# Patient Record
Sex: FEMALE | Race: ASIAN | NOT HISPANIC OR LATINO | URBAN - METROPOLITAN AREA
[De-identification: names, ages, dates, MRNs, and addresses within clinical notes are randomized per-mention and may not be internally consistent; named-entity substitution may affect disease eponyms.]

---

## 2018-06-01 ENCOUNTER — OUTPATIENT (OUTPATIENT)
Dept: OUTPATIENT SERVICES | Facility: HOSPITAL | Age: 63
LOS: 1 days | End: 2018-06-01
Payer: MEDICAID

## 2018-06-01 DIAGNOSIS — Z98.89 OTHER SPECIFIED POSTPROCEDURAL STATES: Chronic | ICD-10-CM

## 2018-06-01 PROCEDURE — G9001: CPT

## 2018-06-24 DIAGNOSIS — R69 ILLNESS, UNSPECIFIED: ICD-10-CM

## 2020-10-14 PROBLEM — Z00.00 ENCOUNTER FOR PREVENTIVE HEALTH EXAMINATION: Status: ACTIVE | Noted: 2020-10-14

## 2020-10-21 ENCOUNTER — APPOINTMENT (OUTPATIENT)
Dept: ENDOCRINOLOGY | Facility: CLINIC | Age: 65
End: 2020-10-21
Payer: MEDICARE

## 2020-10-21 VITALS
BODY MASS INDEX: 36.54 KG/M2 | HEIGHT: 64 IN | DIASTOLIC BLOOD PRESSURE: 56 MMHG | HEART RATE: 82 BPM | SYSTOLIC BLOOD PRESSURE: 148 MMHG | WEIGHT: 214 LBS

## 2020-10-21 DIAGNOSIS — Z72.3 LACK OF PHYSICAL EXERCISE: ICD-10-CM

## 2020-10-21 LAB
GLUCOSE BLDC GLUCOMTR-MCNC: 266
LDLC SERPL DIRECT ASSAY-MCNC: 81

## 2020-10-21 PROCEDURE — 95251 CONT GLUC MNTR ANALYSIS I&R: CPT

## 2020-10-21 PROCEDURE — 99072 ADDL SUPL MATRL&STAF TM PHE: CPT

## 2020-10-21 PROCEDURE — 99205 OFFICE O/P NEW HI 60 MIN: CPT | Mod: 25

## 2020-10-21 PROCEDURE — 82962 GLUCOSE BLOOD TEST: CPT

## 2020-10-21 RX ORDER — METOPROLOL TARTRATE 25 MG/1
25 TABLET, FILM COATED ORAL TWICE DAILY
Refills: 0 | Status: ACTIVE | COMMUNITY

## 2020-10-21 RX ORDER — GABAPENTIN 100 MG
100 TABLET ORAL
Refills: 0 | Status: ACTIVE | COMMUNITY

## 2020-10-21 RX ORDER — MEDROXYPROGESTERONE ACETATE 10 MG/1
10 TABLET ORAL
Refills: 0 | Status: ACTIVE | COMMUNITY

## 2020-10-21 RX ORDER — ATORVASTATIN CALCIUM 40 MG/1
40 TABLET, FILM COATED ORAL DAILY
Refills: 0 | Status: ACTIVE | COMMUNITY

## 2020-10-21 RX ORDER — ALPRAZOLAM 2 MG/1
TABLET ORAL
Refills: 0 | Status: ACTIVE | COMMUNITY

## 2020-10-21 RX ORDER — OMEPRAZOLE 40 MG/1
40 CAPSULE, DELAYED RELEASE ORAL TWICE DAILY
Refills: 0 | Status: ACTIVE | COMMUNITY

## 2020-10-21 RX ORDER — VENLAFAXINE HYDROCHLORIDE 150 MG/1
150 TABLET, EXTENDED RELEASE ORAL
Refills: 0 | Status: ACTIVE | COMMUNITY

## 2020-10-21 RX ORDER — CHLORHEXIDINE GLUCONATE 4 %
325 (65 FE) LIQUID (ML) TOPICAL 3 TIMES DAILY
Refills: 0 | Status: ACTIVE | COMMUNITY

## 2020-10-21 RX ORDER — ALENDRONATE SODIUM 70 MG/1
70 TABLET ORAL
Refills: 0 | Status: ACTIVE | COMMUNITY

## 2020-10-21 RX ORDER — OMEGA-3-ACID ETHYL ESTERS 1 G/1
1 CAPSULE, LIQUID FILLED ORAL
Qty: 360 | Refills: 1 | Status: ACTIVE | COMMUNITY

## 2020-10-21 RX ORDER — ISOSORBIDE MONONITRATE 30 MG
30 TABLET, EXTENDED RELEASE 24 HR ORAL DAILY
Refills: 0 | Status: ACTIVE | COMMUNITY

## 2020-10-21 RX ORDER — ERGOCALCIFEROL 1.25 MG/1
1.25 MG CAPSULE ORAL
Refills: 0 | Status: ACTIVE | COMMUNITY

## 2020-10-21 RX ORDER — NITROGLYCERIN 0.4 MG/1
0.4 TABLET SUBLINGUAL
Refills: 0 | Status: ACTIVE | COMMUNITY

## 2020-10-22 LAB
HBA1C MFR BLD HPLC: 9.9
PODIATRY EVAL: NORMAL

## 2020-11-11 ENCOUNTER — APPOINTMENT (OUTPATIENT)
Dept: ENDOCRINOLOGY | Facility: CLINIC | Age: 65
End: 2020-11-11

## 2020-11-17 ENCOUNTER — NON-APPOINTMENT (OUTPATIENT)
Age: 65
End: 2020-11-17

## 2021-01-26 ENCOUNTER — APPOINTMENT (OUTPATIENT)
Dept: ENDOCRINOLOGY | Facility: CLINIC | Age: 66
End: 2021-01-26

## 2021-03-03 ENCOUNTER — NON-APPOINTMENT (OUTPATIENT)
Age: 66
End: 2021-03-03

## 2021-03-25 LAB
HBA1C MFR BLD HPLC: 9.9
LDLC SERPL DIRECT ASSAY-MCNC: 67
MICROALBUMIN/CREAT 24H UR-RTO: 344

## 2021-03-26 ENCOUNTER — APPOINTMENT (OUTPATIENT)
Dept: ENDOCRINOLOGY | Facility: CLINIC | Age: 66
End: 2021-03-26
Payer: MEDICARE

## 2021-03-26 VITALS
BODY MASS INDEX: 37.22 KG/M2 | OXYGEN SATURATION: 99 % | SYSTOLIC BLOOD PRESSURE: 150 MMHG | WEIGHT: 218 LBS | DIASTOLIC BLOOD PRESSURE: 60 MMHG | HEART RATE: 71 BPM | HEIGHT: 64 IN

## 2021-03-26 LAB — GLUCOSE BLDC GLUCOMTR-MCNC: 402

## 2021-03-26 PROCEDURE — 99214 OFFICE O/P EST MOD 30 MIN: CPT | Mod: 25

## 2021-03-26 PROCEDURE — 99072 ADDL SUPL MATRL&STAF TM PHE: CPT

## 2021-03-26 PROCEDURE — 95251 CONT GLUC MNTR ANALYSIS I&R: CPT

## 2021-03-26 PROCEDURE — 82962 GLUCOSE BLOOD TEST: CPT

## 2021-03-26 RX ORDER — EXENATIDE 2 MG/.65ML
INJECTION, SUSPENSION, EXTENDED RELEASE SUBCUTANEOUS
Refills: 0 | Status: DISCONTINUED | COMMUNITY
End: 2021-03-26

## 2021-03-26 NOTE — PHYSICAL EXAM
[Alert] : alert [Well Nourished] : well nourished [No Acute Distress] : no acute distress [Well Developed] : well developed [Normal Sclera/Conjunctiva] : normal sclera/conjunctiva [EOMI] : extra ocular movement intact [No Proptosis] : no proptosis [Thyroid Not Enlarged] : the thyroid was not enlarged [No Thyroid Nodules] : no palpable thyroid nodules [No Respiratory Distress] : no respiratory distress [No Accessory Muscle Use] : no accessory muscle use [Clear to Auscultation] : lungs were clear to auscultation bilaterally [Normal S1, S2] : normal S1 and S2 [Normal Rate] : heart rate was normal [Regular Rhythm] : with a regular rhythm [No Edema] : no peripheral edema [Normal Anterior Cervical Nodes] : no anterior cervical lymphadenopathy [Normal Posterior Cervical Nodes] : no posterior cervical lymphadenopathy [Acanthosis Nigricans] : acanthosis nigricans present [Oriented x3] : oriented to person, place, and time [Normal Affect] : the affect was normal [Normal Mood] : the mood was normal

## 2021-04-27 ENCOUNTER — APPOINTMENT (OUTPATIENT)
Dept: ENDOCRINOLOGY | Facility: CLINIC | Age: 66
End: 2021-04-27

## 2021-05-13 NOTE — HISTORY OF PRESENT ILLNESS
[FreeTextEntry1] : Type: 2\par Severity: severe; uncontrolled\par Duration: diagnosed 1983\par \par Current regimen:\par Jardiance 25 mg daily- started 3 months ago by PCP\par Humulin R U500 115 units before breakfast at 11am, 115 units before dinner at 6pm, 120 units before bed at midnight without food.\par \par Diet not bad but eats some carbs , rice and arnie bread. Eats twice daily, breakfast and dinner. Rarely snacks between meals.\par Weight: stable\par Exercise: not active, has back issues\par \par SMBG with Freestyle Robert. Reviewed CGMS. Avg , CV 40.4% with 24% of values in range, 21% high, 53% very high, and 2% low. She is having severe post prandial hyperglycemia after dinner and then BG drops in the morning.\par \par Diabetic History\par ER Visits: none\par Hospitalizations: none\par Diabetic Education: no\par Last eye exam: 2/9/2021- mild NPDR, advised evaluation with retinal specialist\par Foot exam: in office 10/21/2021\par Heart disease: CAD s/p PCI (4 stents), follows with cardiology\par Kidney disease: JEAN 344 1/27/2021

## 2021-05-13 NOTE — ASSESSMENT
[FreeTextEntry1] : 65 year old female with severely uncontrolled T2DM, also with thyroid nodules. Glycemic control is highly variable.\par \par 1. T2DM- variable due to timing of insulin and long periods of not eating.\par -Continue Humulin R U500 115 units at breakfast.\par -Increase Humulin R U500 at dinner to 140 units.\par -Do not take another dose of U500 at bedtime without food!\par -Instead, try to have a small meal/snack between breakfast and dinner and take Humulin R U500 50 untis prior the meal/snack.\par -Reviewed insulin action times.\par -Reviewed the importance of small, frequent meals/snacks to steady BG.\par -Continue Jardiance 25 mg daily.\par -Continue SMBG with Freestyle Robert.\par -Repeat A1c and RTO in 1 month for CGM download.\par -Would benefit from follow-up with CDE; will discuss at next visit.\par -Intolerant to multiple GLP1 agonists in the past (Ozempic, Bydureon), but may be willing to consider Trulicity in the future.\par -Discussed benefit of weight loss for diabetes.\par -Glucose sensor necessity: This patient with diabetes performs four or more glucose checks per day utilizing a home blood glucose monitor. The patient is treated with insulin via three or more injections daily. This patient requires frequent adjustments to their insulin treatment on the basis of therapeutic continuous glucose monitoring results. In addition, the patient has been to our office for an evaluation of their diabetes control within the past six months. \par \par 2. Thyroid nodules s/p benign FNA October 2019.\par -Repeat sonogram now.\par \par 3. Abnormal TFTs- TSH normalized on labs January 2021.\par \par 4. Hyperlipidemia- LDL at target.\par -Continue statin.\par -Repeat lipids with next labs.\par \par \par . \par

## 2021-05-13 NOTE — REVIEW OF SYSTEMS
[Polyuria] : polyuria [Polydipsia] : polydipsia [Chest Pain] : no chest pain [Palpitations] : no palpitations [Shortness Of Breath] : no shortness of breath [Nausea] : no nausea [Abdominal Pain] : no abdominal pain [Vomiting] : no vomiting

## 2021-07-19 LAB
HBA1C MFR BLD HPLC: 10.4
LDLC SERPL DIRECT ASSAY-MCNC: 74
MICROALBUMIN/CREAT 24H UR-RTO: 268

## 2021-07-20 ENCOUNTER — APPOINTMENT (OUTPATIENT)
Dept: ENDOCRINOLOGY | Facility: CLINIC | Age: 66
End: 2021-07-20

## 2022-01-25 LAB
HBA1C MFR BLD HPLC: 10.8
LDLC SERPL DIRECT ASSAY-MCNC: 84
MICROALBUMIN/CREAT 24H UR-RTO: 156

## 2022-01-26 ENCOUNTER — APPOINTMENT (OUTPATIENT)
Dept: ENDOCRINOLOGY | Facility: CLINIC | Age: 67
End: 2022-01-26
Payer: MEDICARE

## 2022-01-26 VITALS
HEART RATE: 71 BPM | BODY MASS INDEX: 34.15 KG/M2 | SYSTOLIC BLOOD PRESSURE: 124 MMHG | HEIGHT: 64 IN | WEIGHT: 200 LBS | DIASTOLIC BLOOD PRESSURE: 62 MMHG

## 2022-01-26 LAB — GLUCOSE BLDC GLUCOMTR-MCNC: 322

## 2022-01-26 PROCEDURE — 95251 CONT GLUC MNTR ANALYSIS I&R: CPT

## 2022-01-26 PROCEDURE — 99214 OFFICE O/P EST MOD 30 MIN: CPT | Mod: 25

## 2022-01-26 PROCEDURE — 82962 GLUCOSE BLOOD TEST: CPT

## 2022-02-09 ENCOUNTER — APPOINTMENT (OUTPATIENT)
Dept: ENDOCRINOLOGY | Facility: CLINIC | Age: 67
End: 2022-02-09

## 2022-04-27 ENCOUNTER — APPOINTMENT (OUTPATIENT)
Dept: ENDOCRINOLOGY | Facility: CLINIC | Age: 67
End: 2022-04-27

## 2022-06-17 ENCOUNTER — NON-APPOINTMENT (OUTPATIENT)
Age: 67
End: 2022-06-17

## 2022-06-28 ENCOUNTER — APPOINTMENT (OUTPATIENT)
Dept: ENDOCRINOLOGY | Facility: CLINIC | Age: 67
End: 2022-06-28
Payer: MEDICARE

## 2022-06-28 VITALS
HEART RATE: 77 BPM | WEIGHT: 200 LBS | SYSTOLIC BLOOD PRESSURE: 138 MMHG | HEIGHT: 64 IN | OXYGEN SATURATION: 95 % | BODY MASS INDEX: 34.15 KG/M2 | DIASTOLIC BLOOD PRESSURE: 66 MMHG

## 2022-06-28 LAB — GLUCOSE BLDC GLUCOMTR-MCNC: 492

## 2022-06-28 PROCEDURE — 99214 OFFICE O/P EST MOD 30 MIN: CPT | Mod: 25

## 2022-06-28 PROCEDURE — 82962 GLUCOSE BLOOD TEST: CPT

## 2022-06-28 PROCEDURE — 95251 CONT GLUC MNTR ANALYSIS I&R: CPT

## 2022-06-28 RX ORDER — VENLAFAXINE HYDROCHLORIDE 150 MG/1
150 CAPSULE, EXTENDED RELEASE ORAL
Qty: 90 | Refills: 0 | Status: ACTIVE | COMMUNITY
Start: 2022-06-16

## 2022-06-28 RX ORDER — METOPROLOL SUCCINATE 100 MG/1
100 TABLET, EXTENDED RELEASE ORAL
Qty: 90 | Refills: 0 | Status: ACTIVE | COMMUNITY
Start: 2022-03-02

## 2022-06-28 RX ORDER — METOPROLOL TARTRATE 50 MG/1
50 TABLET, FILM COATED ORAL
Qty: 270 | Refills: 0 | Status: ACTIVE | COMMUNITY
Start: 2021-10-12

## 2022-06-28 RX ORDER — TICAGRELOR 90 MG/1
90 TABLET ORAL
Qty: 180 | Refills: 0 | Status: ACTIVE | COMMUNITY
Start: 2021-10-12

## 2022-06-28 RX ORDER — ENALAPRIL MALEATE 5 MG/1
5 TABLET ORAL
Qty: 90 | Refills: 0 | Status: ACTIVE | COMMUNITY
Start: 2021-05-24

## 2022-06-28 RX ORDER — FLUTICASONE PROPIONATE 50 UG/1
50 SPRAY, METERED NASAL
Qty: 48 | Refills: 0 | Status: ACTIVE | COMMUNITY
Start: 2021-03-12

## 2022-06-28 RX ORDER — EZETIMIBE 10 MG/1
10 TABLET ORAL
Qty: 90 | Refills: 0 | Status: ACTIVE | COMMUNITY
Start: 2022-03-30

## 2022-07-20 ENCOUNTER — APPOINTMENT (OUTPATIENT)
Dept: ENDOCRINOLOGY | Facility: CLINIC | Age: 67
End: 2022-07-20

## 2022-08-25 ENCOUNTER — RESULT REVIEW (OUTPATIENT)
Age: 67
End: 2022-08-25

## 2022-09-27 ENCOUNTER — APPOINTMENT (OUTPATIENT)
Dept: ENDOCRINOLOGY | Facility: CLINIC | Age: 67
End: 2022-09-27

## 2022-09-27 VITALS
BODY MASS INDEX: 38.24 KG/M2 | HEIGHT: 64 IN | WEIGHT: 224 LBS | DIASTOLIC BLOOD PRESSURE: 80 MMHG | HEART RATE: 78 BPM | SYSTOLIC BLOOD PRESSURE: 132 MMHG

## 2022-09-27 LAB
HBA1C MFR BLD HPLC: 10.2
LDLC SERPL DIRECT ASSAY-MCNC: 61

## 2022-09-27 PROCEDURE — 99214 OFFICE O/P EST MOD 30 MIN: CPT | Mod: 25

## 2022-09-27 PROCEDURE — 95251 CONT GLUC MNTR ANALYSIS I&R: CPT

## 2022-09-27 RX ORDER — LANCETS 30 GAUGE
EACH MISCELLANEOUS
Qty: 1 | Refills: 1 | Status: ACTIVE | COMMUNITY
Start: 2022-09-27 | End: 1900-01-01

## 2022-09-27 NOTE — ASSESSMENT
[FreeTextEntry1] : Ms. Musa is a 67 yr old female, who presents today for follow up  in regards type 2 diabetes.  \par Per patient , has been diabetic since 1983\par \par Currently taking toujeo 100 units daily at night, continue  U 500, 160 units before breakfast, and before dinner \par Also on jardiance 25 mg daily.\par \par FS in office 101\par current severity: uncontrolled\par A1C 9.9% in 8/2020, 10.8% in 1/22, 11 in 5/22, 10.2% in 8/22\par \par Sugars very high then low blood sugars on Robert, has significant insulin resistance.\par Will check for hormonal causes , like cushing, acromegaly.\par \par Medication changes:\par Increase U500 to 175 units before breakfast and before dinner\par Continue Toujeo U300 100 units at bedtime \par Needs to confirm lows/high with finger sticks \par To come back in 2 weeks for further dose adjustment.\par Will check 24 hr urine cortisol, IGF-1.\par  To check sugars 4 x a day  at different times.\par To see CDE in 2 weeks with robert for further dose adjustment\par Diet and exercise reviewed.\par Hypoglycemia reviewed.\par \par Thyroid nodules:\par Had thyroid ultrasound in 9/2019 showed multiple nodules, she then had FNA of the nodules in 10/19 all came back benign.\par Will first repeat ultrasound next visit .\par \par Abnormal thyroid functions:\par Her TSH was mildly off, 5.980.recent repeat normal.\par \par \par  Diabetes counselling: \par The patient was counseled on diabetes foot care, long term vascular complications of diabetes, carbohydrate consistent diet, importance of diet and exercise to improve glycemic control, achieve weight loss and improve cardiovascular health and action and use of short and long-acting insulin. Patient was referred to ophthalmology for retinopathy screening. ADA diet (30-50 gm carbohydrates with each meal, 15 grams with snacks. Balance with lean proteins and low fat diet.) Exercise daily per ability, work up to 30 minutes a day. Medication, risks and benefits reviewed. Glucose testing and insulin administration for glycemic management.\par \par No surgery clearance was given, needs A1c <8\par \par \par RTO in 2 weeks with CDE\par RTO in 4-6 weeks with NP\par RTO in 8-10 weeks with Dr. Kauffman \par

## 2022-09-27 NOTE — HISTORY OF PRESENT ILLNESS
[FreeTextEntry1] : Ms. Musa is a 67 yr old female, who presents today for follow up  in regards type 2 diabetes.  \par Per patient , has been diabetic since 1983\par \par Patient had a bariatric consultation for gastric bypass with Dr. Ackerman. Needs to lower A1c before surgery. No date for surgery is scheduled yet. \par \par Currently taking  toujeo 100 units daily at night, continue  U 500, 160 units before breakfast, and before dinner \par Also on jardiance 25 mg daily.\par \par FS in office 101 Scanned on Robert\par current severity: uncontrolled\par A1C 9.9% in 8/2020, 10.8% in 1/22, 11 in 5/22, 10.2% in 8/12/22\par \par \par Home Glucose Monitoring\par Frequency: has robert.\par Robert report downloaded and reviewed:\par from September 14-27, 2022 \par Avg glucose: 198\par glucose variability:48.8%\par In target: 23 %\par Above target: 52%\par Below target:10%.\par \par Variable blood sugars with lows and extreme highs, does not perform finger sticks with low or high blood sugars to confirm reading.\par \par Diet not bad but eats some carbs , rice and arnie bread.\par \par Diabetic History\par ER Visits:  none\par Hospitalizations:  none\par Diet Therapy: now watching his diet\par Diabetic Education:   no\par Exercise:   not active, has back issues\par Last eye exam: 2/2021

## 2022-09-27 NOTE — PHYSICAL EXAM
[Alert] : alert [Obese] : obese [No Acute Distress] : no acute distress [Normal Sclera/Conjunctiva] : normal sclera/conjunctiva [No Proptosis] : no proptosis [Normal Hearing] : hearing was normal [No LAD] : no lymphadenopathy [Thyroid Not Enlarged] : the thyroid was not enlarged [No Thyroid Nodules] : no palpable thyroid nodules [No Respiratory Distress] : no respiratory distress [No Accessory Muscle Use] : no accessory muscle use [Normal Rate and Effort] : normal respiratory rate and effort [Clear to Auscultation] : lungs were clear to auscultation bilaterally [Normal S1, S2] : normal S1 and S2 [Normal Rate] : heart rate was normal [Regular Rhythm] : with a regular rhythm [No Edema] : no peripheral edema [Pedal Pulses Normal] : the pedal pulses are present [Normal Bowel Sounds] : normal bowel sounds [Not Tender] : non-tender [Soft] : abdomen soft [No Rash] : no rash [Acanthosis Nigricans] : no acanthosis nigricans [No Tremors] : no tremors [Oriented x3] : oriented to person, place, and time [Normal Affect] : the affect was normal [Normal Insight/Judgement] : insight and judgment were intact [Normal Mood] : the mood was normal

## 2022-09-27 NOTE — REVIEW OF SYSTEMS
[Recent Weight Gain (___ Lbs)] : recent weight gain: [unfilled] lbs [Visual Field Defect] : visual field defect [Fatigue] : no fatigue [Decreased Appetite] : appetite not decreased [Blurred Vision] : no blurred vision [Dysphagia] : no dysphagia [Neck Pain] : no neck pain [Dysphonia] : no dysphonia [Chest Pain] : no chest pain [Palpitations] : no palpitations [Constipation] : no constipation [Diarrhea] : no diarrhea [Polyuria] : no polyuria [Dysuria] : no dysuria [Headaches] : no headaches [Tremors] : no tremors [Depression] : no depression [Anxiety] : no anxiety [Polydipsia] : no polydipsia [Swelling] : no swelling [de-identified] : controlled with medication

## 2022-10-11 ENCOUNTER — APPOINTMENT (OUTPATIENT)
Dept: ENDOCRINOLOGY | Facility: CLINIC | Age: 67
End: 2022-10-11

## 2022-10-19 ENCOUNTER — APPOINTMENT (OUTPATIENT)
Dept: ENDOCRINOLOGY | Facility: CLINIC | Age: 67
End: 2022-10-19

## 2022-10-19 PROCEDURE — G0108 DIAB MANAGE TRN  PER INDIV: CPT

## 2022-10-20 RX ORDER — GLUCAGON INJECTION, SOLUTION 1 MG/.2ML
1 INJECTION, SOLUTION SUBCUTANEOUS
Qty: 1 | Refills: 3 | Status: ACTIVE | COMMUNITY
Start: 2022-10-19 | End: 1900-01-01

## 2022-11-07 ENCOUNTER — APPOINTMENT (OUTPATIENT)
Dept: ENDOCRINOLOGY | Facility: CLINIC | Age: 67
End: 2022-11-07

## 2022-11-07 VITALS
HEIGHT: 64 IN | DIASTOLIC BLOOD PRESSURE: 60 MMHG | WEIGHT: 224 LBS | HEART RATE: 78 BPM | SYSTOLIC BLOOD PRESSURE: 130 MMHG | OXYGEN SATURATION: 96 % | BODY MASS INDEX: 38.24 KG/M2

## 2022-11-07 LAB
GLUCOSE BLDC GLUCOMTR-MCNC: 162
HBA1C MFR BLD HPLC: 8.9

## 2022-11-07 PROCEDURE — 82962 GLUCOSE BLOOD TEST: CPT

## 2022-11-07 PROCEDURE — 83036 HEMOGLOBIN GLYCOSYLATED A1C: CPT | Mod: QW

## 2022-11-07 PROCEDURE — 95251 CONT GLUC MNTR ANALYSIS I&R: CPT

## 2022-11-07 PROCEDURE — 99214 OFFICE O/P EST MOD 30 MIN: CPT | Mod: 25

## 2022-11-07 RX ORDER — INSULIN GLARGINE 300 U/ML
300 INJECTION, SOLUTION SUBCUTANEOUS
Qty: 30 | Refills: 0 | Status: ACTIVE | COMMUNITY
Start: 2022-09-27

## 2022-11-07 RX ORDER — BLOOD SUGAR DIAGNOSTIC
STRIP MISCELLANEOUS 4 TIMES DAILY
Qty: 200 | Refills: 1 | Status: ACTIVE | COMMUNITY
Start: 2022-09-27 | End: 1900-01-01

## 2022-11-07 NOTE — ASSESSMENT
[FreeTextEntry1] : Ms. Musa is a 67 yr old female, who presents today for follow up  in regards type 2 diabetes.  \par Per patient , has been diabetic since 1983\par \par Currently taking toujeo 100 units daily at night, continue  U 500, 165 units before breakfast, and before dinner \par Also on jardiance 25 mg daily.\par \par FS in office 162\par current severity: uncontrolled\par A1C 9.9% in 8/2020, 10.8% in 1/22, 11 in 5/22, 10.2% in 8/22, A1c 8.9% in office\par \par Sugars very high then low blood sugars on Robert, has significant insulin resistance.\par Will check for hormonal causes, like cushing, acromegaly.\par \par Medication changes:\par Decrease U500 to 160 units before breakfast and increase U500 to 170 units before dinner\par Continue Toujeo U300 100 units at bedtime \par Needs to confirm lows/high with finger sticks \par To come back in 2 weeks for further dose adjustment.\par Will check 24 hr urine cortisol, IGF-1 results pending\par  To check sugars 4 x a day  at different times.\par Diet and exercise reviewed.\par Hypoglycemia reviewed.\par \par Thyroid nodules:\par Had thyroid ultrasound in 9/2019 showed multiple nodules, she then had FNA of the nodules in 10/19 all came back benign.\par Will first repeat ultrasound, going next week\par \par Abnormal thyroid functions:\par Her TSH was mildly off, 5.980.recent repeat TSH 2.5\par \par \par  Diabetes counselling: \par The patient was counseled on diabetes foot care, long term vascular complications of diabetes, carbohydrate consistent diet, importance of diet and exercise to improve glycemic control, achieve weight loss and improve cardiovascular health and action and use of short and long-acting insulin. Patient was referred to ophthalmology for retinopathy screening. ADA diet (30-50 gm carbohydrates with each meal, 15 grams with snacks. Balance with lean proteins and low fat diet.) Exercise daily per ability, work up to 30 minutes a day. Medication, risks and benefits reviewed. Glucose testing and insulin administration for glycemic management.\par \par No surgery clearance was given, needs A1c <8\par \par \par RTO in 4 weeks with Dr. Kauffman \par

## 2022-11-07 NOTE — HISTORY OF PRESENT ILLNESS
[FreeTextEntry1] : Ms. Musa is a 67 yr old female, who presents today for follow up  in regards type 2 diabetes.  \par Per patient , has been diabetic since 1983\par \par Patient had a bariatric consultation for gastric bypass with Dr. Ackerman. Needs to lower A1c before surgery. No date for surgery is scheduled yet. \par \par Currently taking  toujeo 100 units daily at night, continue  U 500, 165 units before breakfast, and before dinner \par Also on jardiance 25 mg daily.\par \par FS in office 162\par current severity: uncontrolled\par A1C 9.9% in 8/2020, 10.8% in 1/22, 11 in 5/22, 10.2% in 8/12/22, A1c 8.9% in office today \par \par \par Home Glucose Monitoring\par Frequency: has robert.\par Robert report downloaded and reviewed:\par from October 25, 2022 - November 7, 2022\par Avg glucose: 179\par glucose variability:43.4%\par In target: 48 %\par Above target: 46%\par Below target:6%.\par \par Variable blood sugars with lows and extreme highs, does not perform finger sticks with low or high blood sugars to confirm reading.\par \par Diet not bad but eats some carbs , rice and arnie bread.\par \par Diabetic History\par ER Visits:  none\par Hospitalizations:  none\par Diet Therapy: now watching his diet\par Diabetic Education:   no\par Exercise:   not active, has back issues\par Last eye exam: 2/2021 +retinopathy, laser procedure last week

## 2022-11-07 NOTE — REVIEW OF SYSTEMS
[Depression] : depression [Anxiety] : anxiety [Fatigue] : no fatigue [Decreased Appetite] : appetite not decreased [Recent Weight Gain (___ Lbs)] : no recent weight gain [Recent Weight Loss (___ Lbs)] : no recent weight loss [Visual Field Defect] : no visual field defect [Blurred Vision] : no blurred vision [Dysphagia] : no dysphagia [Neck Pain] : no neck pain [Dysphonia] : no dysphonia [Chest Pain] : no chest pain [Palpitations] : no palpitations [Constipation] : no constipation [Diarrhea] : no diarrhea [Polyuria] : no polyuria [Dysuria] : no dysuria [Headaches] : no headaches [Tremors] : no tremors [Polydipsia] : no polydipsia [Swelling] : no swelling [FreeTextEntry2] : weight stable [de-identified] : controlled with medication

## 2022-11-22 ENCOUNTER — APPOINTMENT (OUTPATIENT)
Dept: ENDOCRINOLOGY | Facility: CLINIC | Age: 67
End: 2022-11-22

## 2022-11-22 VITALS
HEART RATE: 89 BPM | WEIGHT: 216 LBS | BODY MASS INDEX: 36.88 KG/M2 | HEIGHT: 64 IN | DIASTOLIC BLOOD PRESSURE: 86 MMHG | SYSTOLIC BLOOD PRESSURE: 154 MMHG

## 2022-11-22 PROCEDURE — 95251 CONT GLUC MNTR ANALYSIS I&R: CPT

## 2022-11-22 PROCEDURE — 99214 OFFICE O/P EST MOD 30 MIN: CPT | Mod: 25

## 2022-12-12 ENCOUNTER — NON-APPOINTMENT (OUTPATIENT)
Age: 67
End: 2022-12-12

## 2023-01-23 ENCOUNTER — APPOINTMENT (OUTPATIENT)
Dept: ENDOCRINOLOGY | Facility: CLINIC | Age: 68
End: 2023-01-23
Payer: MEDICARE

## 2023-01-23 VITALS
OXYGEN SATURATION: 94 % | HEIGHT: 64 IN | DIASTOLIC BLOOD PRESSURE: 66 MMHG | HEART RATE: 78 BPM | WEIGHT: 223 LBS | BODY MASS INDEX: 38.07 KG/M2 | SYSTOLIC BLOOD PRESSURE: 130 MMHG

## 2023-01-23 LAB — GLUCOSE BLDC GLUCOMTR-MCNC: 193

## 2023-01-23 PROCEDURE — 82962 GLUCOSE BLOOD TEST: CPT

## 2023-01-23 PROCEDURE — 99214 OFFICE O/P EST MOD 30 MIN: CPT | Mod: 25

## 2023-01-23 PROCEDURE — 95251 CONT GLUC MNTR ANALYSIS I&R: CPT

## 2023-01-23 NOTE — HISTORY OF PRESENT ILLNESS
[FreeTextEntry1] : Ms. Musa is a 65 yr old female, who presents today for follow up  in regards type 2 diabetes.  \par Per patient , has been diabetic since 1983\par She is obese, her insulin dose requirements a very high, we did 24 hr urine cortisol recently to rule out cushing disease, it was normal.\par \par Currently taking toujeo 95 units daily at night,  U 500, 175 units before breakfast, and before dinner \par Also on jardiance 25 mg daily.\par \par \par FS in office 193\par current severity: uncontrolled\par A1C 9.9% in 8/2020, 10.8% in 1/22, 11 in 5/22 , 8.9% in 11/22,  A1c 9.4% 1/18/23\par \par \par Home Glucose Monitoring\par Freestyle Robert\par Frequency: 82%\par Robert report downloaded and reviewed:\par Avg glucose: 229\par glucose variability:40.3%\par In target: 28%\par Above target: 69%\par Below target: 3%\par \par download reviewed, having some lows early morning and mid night , high postprandials.\par \par Diet not bad but eats some carbs , rice and arnie bread.\par \par Diabetic History\par ER Visits:  none\par Hospitalizations:   none\par Diet Therapy: now watching his diet\par Diabetic Education:   no\par Exercise:   not active, has back issues\par Last eye exam: 3 months ago (-) \govind Recently fell on floor due to syncope event, due to blood sugars\par \par \par

## 2023-01-23 NOTE — REVIEW OF SYSTEMS
[Recent Weight Gain (___ Lbs)] : recent weight gain: [unfilled] lbs [Neck Pain] : neck pain [Constipation] : constipation [Headaches] : headaches [Anxiety] : anxiety [Fatigue] : no fatigue [Decreased Appetite] : appetite not decreased [Visual Field Defect] : no visual field defect [Blurred Vision] : no blurred vision [Dysphagia] : no dysphagia [Dysphonia] : no dysphonia [Chest Pain] : no chest pain [Palpitations] : no palpitations [Diarrhea] : no diarrhea [Polyuria] : no polyuria [Dysuria] : no dysuria [Tremors] : no tremors [Depression] : no depression [Polydipsia] : no polydipsia [Swelling] : no swelling [FreeTextEntry4] : posterior neck due to recent fall [FreeTextEntry8] : increased urination

## 2023-01-23 NOTE — PHYSICAL EXAM
[Alert] : alert [Obese] : obese [No Acute Distress] : no acute distress [Normal Sclera/Conjunctiva] : normal sclera/conjunctiva [No Proptosis] : no proptosis [No LAD] : no lymphadenopathy [Thyroid Not Enlarged] : the thyroid was not enlarged [No Thyroid Nodules] : no palpable thyroid nodules [No Respiratory Distress] : no respiratory distress [No Accessory Muscle Use] : no accessory muscle use [Normal Rate and Effort] : normal respiratory rate and effort [Clear to Auscultation] : lungs were clear to auscultation bilaterally [Normal S1, S2] : normal S1 and S2 [Normal Rate] : heart rate was normal [Regular Rhythm] : with a regular rhythm [Normal Bowel Sounds] : normal bowel sounds [Not Tender] : non-tender [Soft] : abdomen soft [Normal Gait] : normal gait [No Rash] : no rash [Acanthosis Nigricans] : no acanthosis nigricans [No Tremors] : no tremors [Oriented x3] : oriented to person, place, and time [Normal Affect] : the affect was normal [Normal Insight/Judgement] : insight and judgment were intact [Normal Mood] : the mood was normal

## 2023-01-23 NOTE — ASSESSMENT
[FreeTextEntry1] : Ms. Musa is a 65 yr old female, who presents today for follow up  in regards type 2 diabetes.  \par Per patient , has been diabetic since 1983\par She is obese, her insulin dose requirements a very high, we did 24 hr urine cortisol recently to rule out cushing disease, it was normal.\par \par Medication changes:\par Will  back off  toujeo to 40 units daily at night, continue U 500, 175 units bid with meals. Start Ozempic 0.25 mg weekly if tolerating well in 4 weeks increase to 0.5 mg weekly, reviewed s/e, denies hx of pancreatitis and/or Thyroid Medullary Cancer, sample pen given \par To come back in 2  to 4 weeks for further dose adjustment.\par Will check 24 hr urine cortisol, came back normal\par  To check sugars 4 x a day  at different times.\par To see NP  in 4 weeks with alta for further dose adjustment\par Diet and exercise reviewed.\par Hypoglycemia reviewed.\par  Eyes: no  active issues, no known   retinopathy\par Feet: no active issues, no neuropathy.  Diabetic foot care reviewed.\par  Lipids:  on statin/ anti-lipid treatment. Last LDL: 81\par  Renal/ B/P : B/P wnl , on ACE/ ARB. has proteinuria.will repeat next visit.  \par Weight:   obese  Balanced diet and exercise reviewed. Start Ozempic\par \par \par Thyroid nodules:\par Had thyroid ultrasound in 9/2019 showed multiple nodules, she then had FNA of the nodules in 10/19 all came back benign.\par Recent thyroid sonogram shows stable bilateral thyroid nodules, repeat thyroid sonogram in 1 year\par \par Abnormal thyroid functions:\par Her TSH was mildly off.recent repeat normal.\par \par \par  Diabetes counselling: \par The patient was counseled on diabetes foot care, long term vascular complications of diabetes, carbohydrate consistent diet, importance of diet and exercise to improve glycemic control, achieve weight loss and improve cardiovascular health and action and use of short and long-acting insulin. Patient was referred to ophthalmology for retinopathy screening. ADA diet (30-50 gm carbohydrates with each meal, 15 grams with snacks. Balance with lean proteins and low fat diet.) Exercise daily per ability, work up to 30 minutes a day. Medication, risks and benefits reviewed. Glucose testing and insulin administration for glycemic management.\par \par \par RTO in 4-6 weeks with NP \par RTO in 2 months with Dr. Kauffman

## 2023-03-10 ENCOUNTER — APPOINTMENT (OUTPATIENT)
Dept: ENDOCRINOLOGY | Facility: CLINIC | Age: 68
End: 2023-03-10

## 2023-03-20 LAB
HBA1C MFR BLD HPLC: 9.4
LDLC SERPL DIRECT ASSAY-MCNC: 81
MICROALBUMIN/CREAT 24H UR-RTO: 42
TSH SERPL-ACNC: 2.01

## 2023-03-21 ENCOUNTER — APPOINTMENT (OUTPATIENT)
Dept: ENDOCRINOLOGY | Facility: CLINIC | Age: 68
End: 2023-03-21

## 2023-04-26 ENCOUNTER — APPOINTMENT (OUTPATIENT)
Dept: ENDOCRINOLOGY | Facility: CLINIC | Age: 68
End: 2023-04-26
Payer: MEDICARE

## 2023-04-26 ENCOUNTER — RESULT CHARGE (OUTPATIENT)
Age: 68
End: 2023-04-26

## 2023-04-26 VITALS
HEIGHT: 64 IN | SYSTOLIC BLOOD PRESSURE: 128 MMHG | DIASTOLIC BLOOD PRESSURE: 70 MMHG | BODY MASS INDEX: 36.19 KG/M2 | WEIGHT: 212 LBS | HEART RATE: 78 BPM

## 2023-04-26 LAB — GLUCOSE BLDC GLUCOMTR-MCNC: 174

## 2023-04-26 PROCEDURE — 99214 OFFICE O/P EST MOD 30 MIN: CPT | Mod: 25

## 2023-04-26 PROCEDURE — 82962 GLUCOSE BLOOD TEST: CPT

## 2023-04-26 PROCEDURE — 95251 CONT GLUC MNTR ANALYSIS I&R: CPT

## 2023-04-26 RX ORDER — PEN NEEDLE, DIABETIC 29 G X1/2"
32G X 4 MM NEEDLE, DISPOSABLE MISCELLANEOUS
Qty: 3 | Refills: 1 | Status: ACTIVE | COMMUNITY
Start: 2021-03-26 | End: 1900-01-01

## 2023-04-26 NOTE — HISTORY OF PRESENT ILLNESS
[FreeTextEntry1] : Ms. Musa is a 65 yr old female, who presents today for follow up  in regards type 2 diabetes.  \par Per patient , has been diabetic since 1983\par She is obese, her insulin dose requirements a very high, we did 24 hr urine cortisol recently to rule out cushing disease, it was normal.\par \par Currently taking toujeo 40 units daily at night,  U 500, 175 units before breakfast, and before dinner \par Ozempic 0.5 mg weekly \par Also on jardiance 25 mg daily.\par \par \par FS in office \par current severity: uncontrolled\par A1C 9.9% in 8/2020, 10.8% in 1/22, 11 in 5/22 , 8.9% in 11/22,  A1c 9.4% 1/18/23\par \par \par Home Glucose Monitoring\par Freestyle Robert\par Frequency: 88%\par Robert report downloaded and reviewed:\par Avg glucose: 181\par glucose variability:52.9%\par In target: 45%\par Above target: 44%\par Below target: 11%\par \par download reviewed, having some lows late morning and mid day , high postprandials.\par \par Diet not bad but eats some carbs , rice and arnie bread.\par \par Diabetic History\par ER Visits:  none\par Hospitalizations:   none\par Diet Therapy: now watching his diet\par Diabetic Education:   no\par Exercise:   not active, has back issues\par Last eye exam: 3 months ago (-) Neo Recently fell on floor due to syncope event, due to blood sugars\par \par \par

## 2023-04-26 NOTE — REVIEW OF SYSTEMS
[Recent Weight Loss (___ Lbs)] : recent weight loss: [unfilled] lbs [Constipation] : constipation [Depression] : depression [Anxiety] : anxiety [Fatigue] : no fatigue [Decreased Appetite] : appetite not decreased [Visual Field Defect] : no visual field defect [Blurred Vision] : no blurred vision [Dysphagia] : no dysphagia [Neck Pain] : no neck pain [Dysphonia] : no dysphonia [Chest Pain] : no chest pain [Palpitations] : no palpitations [Diarrhea] : no diarrhea [Polyuria] : no polyuria [Dysuria] : no dysuria [Headaches] : no headaches [Tremors] : no tremors [Polydipsia] : no polydipsia [Swelling] : no swelling [de-identified] : controlled with medication

## 2023-04-26 NOTE — ASSESSMENT
[FreeTextEntry1] : Ms. Musa is a 67 yr old female, who presents today for follow up  in regards type 2 diabetes.  \par Per patient , has been diabetic since 1983\par She is obese, her insulin dose requirements a very high, we did 24 hr urine cortisol recently to rule out cushing disease, it was normal.\par \par Medication changes: Check A1c now\par Toujeo to 40 units daily at night, continue U 500, 175 units before breakfast and 180 units before dinner along with 50 units before night time snack. Increase Ozempic to 1 mg weekly. \par To come back in 2  to 4 weeks for further dose adjustment.\par Will check 24 hr urine cortisol, came back normal\par  To check sugars 4 x a day  at different times.\par Diet and exercise reviewed.\par Hypoglycemia reviewed.\par  Eyes: no  active issues, no known   retinopathy\par Feet: no active issues, no neuropathy.  Diabetic foot care reviewed.\par  Lipids:  on statin/ anti-lipid treatment. Last LDL: 81, check lipids now\par  Renal/ B/P : B/P wnl , on ACE/ ARB. has proteinuria.will repeat next visit.  \par Weight:   obese  Balanced diet and exercise reviewed. Increase Ozempic\par \par \par Thyroid nodules:\par Had thyroid ultrasound in 9/2019 showed multiple nodules, she then had FNA of the nodules in 10/19 all came back benign.\par Recent thyroid sonogram shows stable bilateral thyroid nodules, repeat thyroid sonogram in 1 year, 12/2023\par \par Abnormal thyroid functions:\par Her TSH was mildly off.repeat TFTs now\par \par \par  Diabetes counselling: \par The patient was counseled on diabetes foot care, long term vascular complications of diabetes, carbohydrate consistent diet, importance of diet and exercise to improve glycemic control, achieve weight loss and improve cardiovascular health and action and use of short and long-acting insulin. Patient was referred to ophthalmology for retinopathy screening. ADA diet (30-50 gm carbohydrates with each meal, 15 grams with snacks. Balance with lean proteins and low fat diet.) Exercise daily per ability, work up to 30 minutes a day. Medication, risks and benefits reviewed. Glucose testing and insulin administration for glycemic management.\par \par \par RTO in 2 months with Dr. Kauffman

## 2023-04-26 NOTE — PHYSICAL EXAM
[Alert] : alert [Obese] : obese [Normal Sclera/Conjunctiva] : normal sclera/conjunctiva [No Acute Distress] : no acute distress [No Proptosis] : no proptosis [No LAD] : no lymphadenopathy [Thyroid Not Enlarged] : the thyroid was not enlarged [No Thyroid Nodules] : no palpable thyroid nodules [No Respiratory Distress] : no respiratory distress [No Accessory Muscle Use] : no accessory muscle use [Normal Rate and Effort] : normal respiratory rate and effort [Clear to Auscultation] : lungs were clear to auscultation bilaterally [Normal S1, S2] : normal S1 and S2 [Normal Rate] : heart rate was normal [Regular Rhythm] : with a regular rhythm [Normal Bowel Sounds] : normal bowel sounds [Not Tender] : non-tender [Soft] : abdomen soft [Normal Gait] : normal gait [No Rash] : no rash [Acanthosis Nigricans] : acanthosis nigricans present [No Tremors] : no tremors [Oriented x3] : oriented to person, place, and time [Normal Affect] : the affect was normal [Normal Insight/Judgement] : insight and judgment were intact [Normal Mood] : the mood was normal

## 2023-04-28 ENCOUNTER — NON-APPOINTMENT (OUTPATIENT)
Age: 68
End: 2023-04-28

## 2023-05-15 LAB
HBA1C MFR BLD HPLC: 8.6
LDLC SERPL DIRECT ASSAY-MCNC: 67
MICROALBUMIN/CREAT 24H UR-RTO: 28
TSH SERPL-ACNC: 2.03

## 2023-05-16 ENCOUNTER — TRANSCRIPTION ENCOUNTER (OUTPATIENT)
Age: 68
End: 2023-05-16

## 2023-05-16 ENCOUNTER — APPOINTMENT (OUTPATIENT)
Dept: ENDOCRINOLOGY | Facility: CLINIC | Age: 68
End: 2023-05-16
Payer: MEDICARE

## 2023-05-16 VITALS
OXYGEN SATURATION: 95 % | SYSTOLIC BLOOD PRESSURE: 122 MMHG | HEART RATE: 86 BPM | DIASTOLIC BLOOD PRESSURE: 62 MMHG | HEIGHT: 64 IN | WEIGHT: 217 LBS | BODY MASS INDEX: 37.05 KG/M2

## 2023-05-16 LAB — GLUCOSE BLDC GLUCOMTR-MCNC: 261

## 2023-05-16 PROCEDURE — 82962 GLUCOSE BLOOD TEST: CPT

## 2023-05-16 PROCEDURE — 99214 OFFICE O/P EST MOD 30 MIN: CPT | Mod: 25

## 2023-05-16 NOTE — ASSESSMENT
[FreeTextEntry1] : Ms. Musa is a 67 yr old female, who presents today for follow up  in regards type 2 diabetes.  \par Per patient , has been diabetic since 1983\par She is obese, her insulin dose requirements a very high, we did 24 hr urine cortisol recently to rule out cushing disease, it was normal.\par Currently taking toujeo 40 units daily at night,  U 500, 175 to  180 units before breakfast, and before dinner \par Ozempic 1 mg weekly \par Also on jardiance 25 mg daily.\par \par \par FS in office  261, she tells  me she just ate a donut, normal she does not eat donuts or sugary things\par current severity: uncontrolled\par A1C 9.9% in 8/2020, 10.8% in 1/22, 11 in 5/22 , 8.9% in 11/22,  A1c 9.4% 1/18/23 , 8.6% in 4/23\par \par She is going for bariatric surgery and hernia repair in 6/23.\par will get her sugars under better control before that.\par To come and see CDE in 2 to 3  weeks with alta.\par \par Medication changes: \par Having some lows, to back off on dose of Toujeo to 35 units daily at night, continue U 500, 175 units before breakfast and 180 units before dinner along with 50 units before night time snack. continue  Ozempic to 1 mg weekly. \par To come back in 2  to 4 weeks with alta  for further dose adjustment.\par  24 hr urine cortisol, came back normal\par  To check sugars 4 x a day  at different times.\par Diet and exercise reviewed.\par Hypoglycemia reviewed.\par  Eyes: no  active issues, no known   retinopathy\par Feet: no active issues, no neuropathy.  Diabetic foot care reviewed.\par  Lipids:  on statin/ anti-lipid treatment. Last LDL: 67.\par  Renal/ B/P : B/P wnl , on ACE/ ARB. proteinuria resolved. .will repeat next visit.  \par Weight:   obese  Balanced diet and exercise reviewed. Increase Ozempic\par \par \par Thyroid nodules:\par Had thyroid ultrasound in 9/2019 showed multiple nodules, she then had FNA of the nodules in 10/19 all came back benign.\par Thyroid sonogram in 12/22 shows stable bilateral thyroid nodules, repeat thyroid sonogram in 1 year, 12/2023\par \par Abnormal thyroid functions:\par Her TSH were  mildly off.recent repeat TFTs normal in 4/23.\par \par \par  Diabetes counselling: \par The patient was counseled on diabetes foot care, long term vascular complications of diabetes, carbohydrate consistent diet, importance of diet and exercise to improve glycemic control, achieve weight loss and improve cardiovascular health and action and use of short and long-acting insulin. Patient was referred to ophthalmology for retinopathy screening. ADA diet (30-50 gm carbohydrates with each meal, 15 grams with snacks. Balance with lean proteins and low fat diet.) Exercise daily per ability, work up to 30 minutes a day. Medication, risks and benefits reviewed. Glucose testing and insulin administration for glycemic management.\par \par \par RTO in 3 months

## 2023-05-16 NOTE — PHYSICAL EXAM
[Alert] : alert [Obese] : obese [No Acute Distress] : no acute distress [Normal Sclera/Conjunctiva] : normal sclera/conjunctiva [No Proptosis] : no proptosis [No LAD] : no lymphadenopathy [Thyroid Not Enlarged] : the thyroid was not enlarged [No Thyroid Nodules] : no palpable thyroid nodules [No Respiratory Distress] : no respiratory distress [No Accessory Muscle Use] : no accessory muscle use [Normal Rate and Effort] : normal respiratory rate and effort [Clear to Auscultation] : lungs were clear to auscultation bilaterally [Normal S1, S2] : normal S1 and S2 [Normal Rate] : heart rate was normal [Regular Rhythm] : with a regular rhythm [Normal Gait] : normal gait [No Rash] : no rash [Acanthosis Nigricans] : acanthosis nigricans present [Oriented x3] : oriented to person, place, and time [Normal Affect] : the affect was normal [Normal Insight/Judgement] : insight and judgment were intact [Normal Mood] : the mood was normal

## 2023-05-16 NOTE — REVIEW OF SYSTEMS
[Recent Weight Loss (___ Lbs)] : recent weight loss: [unfilled] lbs [Constipation] : constipation [Depression] : depression [Anxiety] : anxiety [Fatigue] : no fatigue [Decreased Appetite] : appetite not decreased [Visual Field Defect] : no visual field defect [Blurred Vision] : no blurred vision [Dysphagia] : no dysphagia [Neck Pain] : no neck pain [Dysphonia] : no dysphonia [Chest Pain] : no chest pain [Palpitations] : no palpitations [Diarrhea] : no diarrhea [Polyuria] : no polyuria [Dysuria] : no dysuria [Headaches] : no headaches [Tremors] : no tremors [Polydipsia] : no polydipsia [Swelling] : no swelling [de-identified] : controlled with medication

## 2023-05-26 ENCOUNTER — NON-APPOINTMENT (OUTPATIENT)
Age: 68
End: 2023-05-26

## 2023-06-02 ENCOUNTER — RX RENEWAL (OUTPATIENT)
Age: 68
End: 2023-06-02

## 2023-06-02 RX ORDER — FLASH GLUCOSE SCANNING READER
EACH MISCELLANEOUS
Qty: 1 | Refills: 0 | Status: ACTIVE | COMMUNITY
Start: 2023-04-26 | End: 1900-01-01

## 2023-06-08 ENCOUNTER — APPOINTMENT (OUTPATIENT)
Dept: ENDOCRINOLOGY | Facility: CLINIC | Age: 68
End: 2023-06-08

## 2023-06-08 ENCOUNTER — NON-APPOINTMENT (OUTPATIENT)
Age: 68
End: 2023-06-08

## 2023-06-27 ENCOUNTER — APPOINTMENT (OUTPATIENT)
Dept: ENDOCRINOLOGY | Facility: CLINIC | Age: 68
End: 2023-06-27

## 2023-07-26 ENCOUNTER — NON-APPOINTMENT (OUTPATIENT)
Age: 68
End: 2023-07-26

## 2023-08-01 ENCOUNTER — NON-APPOINTMENT (OUTPATIENT)
Age: 68
End: 2023-08-01

## 2023-08-16 LAB — HBA1C MFR BLD HPLC: 7.9

## 2023-08-17 ENCOUNTER — APPOINTMENT (OUTPATIENT)
Dept: ENDOCRINOLOGY | Facility: CLINIC | Age: 68
End: 2023-08-17

## 2023-09-14 LAB
HBA1C MFR BLD HPLC: 8.2
LDLC SERPL DIRECT ASSAY-MCNC: 45
MICROALBUMIN/CREAT 24H UR-RTO: 21

## 2023-09-15 ENCOUNTER — APPOINTMENT (OUTPATIENT)
Dept: ENDOCRINOLOGY | Facility: CLINIC | Age: 68
End: 2023-09-15

## 2023-09-20 ENCOUNTER — APPOINTMENT (OUTPATIENT)
Dept: ENDOCRINOLOGY | Facility: CLINIC | Age: 68
End: 2023-09-20
Payer: MEDICARE

## 2023-09-20 PROCEDURE — G0108 DIAB MANAGE TRN  PER INDIV: CPT

## 2023-09-20 RX ORDER — FLASH GLUCOSE SCANNING READER
EACH MISCELLANEOUS
Qty: 1 | Refills: 0 | Status: DISCONTINUED | COMMUNITY
Start: 2022-01-18 | End: 2023-09-20

## 2023-10-26 ENCOUNTER — RX RENEWAL (OUTPATIENT)
Age: 68
End: 2023-10-26

## 2023-11-10 ENCOUNTER — APPOINTMENT (OUTPATIENT)
Dept: ENDOCRINOLOGY | Facility: CLINIC | Age: 68
End: 2023-11-10

## 2023-11-15 ENCOUNTER — APPOINTMENT (OUTPATIENT)
Dept: ENDOCRINOLOGY | Facility: CLINIC | Age: 68
End: 2023-11-15

## 2023-11-20 ENCOUNTER — NON-APPOINTMENT (OUTPATIENT)
Age: 68
End: 2023-11-20

## 2023-11-27 ENCOUNTER — APPOINTMENT (OUTPATIENT)
Dept: ENDOCRINOLOGY | Facility: CLINIC | Age: 68
End: 2023-11-27
Payer: MEDICARE

## 2023-11-27 PROCEDURE — 99214 OFFICE O/P EST MOD 30 MIN: CPT | Mod: 95

## 2023-11-27 RX ORDER — EMPAGLIFLOZIN 25 MG/1
25 TABLET, FILM COATED ORAL
Qty: 90 | Refills: 0 | Status: DISCONTINUED | COMMUNITY
Start: 2022-04-12 | End: 2023-11-27

## 2023-11-27 RX ORDER — INSULIN HUMAN 500 [IU]/ML
500 INJECTION, SOLUTION SUBCUTANEOUS
Qty: 6 | Refills: 1 | Status: DISCONTINUED | COMMUNITY
Start: 2022-01-26 | End: 2023-11-27

## 2023-11-27 RX ORDER — INSULIN HUMAN 500 [IU]/ML
500 INJECTION, SOLUTION SUBCUTANEOUS
Qty: 23 | Refills: 1 | Status: DISCONTINUED | COMMUNITY
End: 2023-11-27

## 2024-01-08 LAB
HBA1C MFR BLD HPLC: 8
LDLC SERPL DIRECT ASSAY-MCNC: 64

## 2024-01-09 ENCOUNTER — APPOINTMENT (OUTPATIENT)
Dept: ENDOCRINOLOGY | Facility: CLINIC | Age: 69
End: 2024-01-09
Payer: MEDICARE

## 2024-01-09 VITALS
OXYGEN SATURATION: 99 % | SYSTOLIC BLOOD PRESSURE: 122 MMHG | HEART RATE: 78 BPM | DIASTOLIC BLOOD PRESSURE: 70 MMHG | BODY MASS INDEX: 32.1 KG/M2 | WEIGHT: 188 LBS | HEIGHT: 64 IN

## 2024-01-09 LAB — GLUCOSE BLDC GLUCOMTR-MCNC: 134

## 2024-01-09 PROCEDURE — 95251 CONT GLUC MNTR ANALYSIS I&R: CPT

## 2024-01-09 PROCEDURE — 82962 GLUCOSE BLOOD TEST: CPT

## 2024-01-09 PROCEDURE — 99214 OFFICE O/P EST MOD 30 MIN: CPT | Mod: 25

## 2024-01-09 RX ORDER — SEMAGLUTIDE 1.34 MG/ML
4 INJECTION, SOLUTION SUBCUTANEOUS
Qty: 3 | Refills: 3 | Status: ACTIVE | COMMUNITY
Start: 2023-01-23 | End: 1900-01-01

## 2024-01-09 RX ORDER — INSULIN GLARGINE 300 U/ML
300 INJECTION, SOLUTION SUBCUTANEOUS
Qty: 4 | Refills: 3 | Status: ACTIVE | COMMUNITY
Start: 2022-06-28 | End: 1900-01-01

## 2024-05-07 RX ORDER — INSULIN LISPRO 100 [IU]/ML
100 INJECTION, SOLUTION INTRAVENOUS; SUBCUTANEOUS
Qty: 4 | Refills: 0 | Status: ACTIVE | COMMUNITY
Start: 2023-09-22 | End: 1900-01-01

## 2024-06-24 LAB
HBA1C MFR BLD HPLC: 10
LDLC SERPL DIRECT ASSAY-MCNC: 66
MICROALBUMIN/CREAT 24H UR-RTO: 7
TSH SERPL-ACNC: 2.09

## 2024-06-25 ENCOUNTER — APPOINTMENT (OUTPATIENT)
Dept: ENDOCRINOLOGY | Facility: CLINIC | Age: 69
End: 2024-06-25
Payer: MEDICARE

## 2024-06-25 VITALS
HEIGHT: 64 IN | DIASTOLIC BLOOD PRESSURE: 68 MMHG | SYSTOLIC BLOOD PRESSURE: 112 MMHG | WEIGHT: 174 LBS | OXYGEN SATURATION: 97 % | HEART RATE: 80 BPM | BODY MASS INDEX: 29.71 KG/M2

## 2024-06-25 DIAGNOSIS — R94.6 ABNORMAL RESULTS OF THYROID FUNCTION STUDIES: ICD-10-CM

## 2024-06-25 DIAGNOSIS — E04.1 NONTOXIC SINGLE THYROID NODULE: ICD-10-CM

## 2024-06-25 DIAGNOSIS — Z79.4 TYPE 2 DIABETES MELLITUS W/OUT COMPLICATIONS: ICD-10-CM

## 2024-06-25 DIAGNOSIS — E78.5 HYPERLIPIDEMIA, UNSPECIFIED: ICD-10-CM

## 2024-06-25 DIAGNOSIS — E11.9 TYPE 2 DIABETES MELLITUS W/OUT COMPLICATIONS: ICD-10-CM

## 2024-06-25 LAB — GLUCOSE BLDC GLUCOMTR-MCNC: 240

## 2024-06-25 PROCEDURE — 99214 OFFICE O/P EST MOD 30 MIN: CPT

## 2024-06-25 PROCEDURE — 82962 GLUCOSE BLOOD TEST: CPT

## 2024-06-25 PROCEDURE — 95251 CONT GLUC MNTR ANALYSIS I&R: CPT

## 2024-06-25 RX ORDER — FLASH GLUCOSE SENSOR
KIT MISCELLANEOUS
Qty: 6 | Refills: 1 | Status: ACTIVE | COMMUNITY
Start: 2022-06-02 | End: 1900-01-01

## 2024-07-22 ENCOUNTER — RX RENEWAL (OUTPATIENT)
Age: 69
End: 2024-07-22

## 2024-10-16 ENCOUNTER — RESULT CHARGE (OUTPATIENT)
Age: 69
End: 2024-10-16

## 2024-10-16 ENCOUNTER — APPOINTMENT (OUTPATIENT)
Dept: ENDOCRINOLOGY | Facility: CLINIC | Age: 69
End: 2024-10-16
Payer: MEDICARE

## 2024-10-16 VITALS
DIASTOLIC BLOOD PRESSURE: 50 MMHG | HEIGHT: 64 IN | OXYGEN SATURATION: 99 % | HEART RATE: 34 BPM | SYSTOLIC BLOOD PRESSURE: 102 MMHG | BODY MASS INDEX: 29.19 KG/M2 | WEIGHT: 171 LBS

## 2024-10-16 DIAGNOSIS — Z79.4 TYPE 2 DIABETES MELLITUS W/OUT COMPLICATIONS: ICD-10-CM

## 2024-10-16 DIAGNOSIS — E04.1 NONTOXIC SINGLE THYROID NODULE: ICD-10-CM

## 2024-10-16 DIAGNOSIS — E78.5 HYPERLIPIDEMIA, UNSPECIFIED: ICD-10-CM

## 2024-10-16 DIAGNOSIS — R94.6 ABNORMAL RESULTS OF THYROID FUNCTION STUDIES: ICD-10-CM

## 2024-10-16 DIAGNOSIS — E11.9 TYPE 2 DIABETES MELLITUS W/OUT COMPLICATIONS: ICD-10-CM

## 2024-10-16 LAB — GLUCOSE BLDC GLUCOMTR-MCNC: 226

## 2024-10-16 PROCEDURE — 82962 GLUCOSE BLOOD TEST: CPT

## 2024-10-16 PROCEDURE — 95251 CONT GLUC MNTR ANALYSIS I&R: CPT

## 2024-10-16 PROCEDURE — 99215 OFFICE O/P EST HI 40 MIN: CPT

## 2024-10-16 RX ORDER — BLOOD-GLUCOSE SENSOR
EACH MISCELLANEOUS
Qty: 6 | Refills: 3 | Status: ACTIVE | COMMUNITY
Start: 2024-10-16 | End: 1900-01-01

## 2024-10-23 RX ORDER — EMPAGLIFLOZIN 25 MG/1
25 TABLET, FILM COATED ORAL
Qty: 90 | Refills: 1 | Status: ACTIVE | COMMUNITY
Start: 2024-10-23 | End: 1900-01-01

## 2024-11-08 ENCOUNTER — APPOINTMENT (OUTPATIENT)
Dept: ENDOCRINOLOGY | Facility: CLINIC | Age: 69
End: 2024-11-08

## 2024-11-29 ENCOUNTER — NON-APPOINTMENT (OUTPATIENT)
Age: 69
End: 2024-11-29

## 2024-11-29 DIAGNOSIS — I10 ESSENTIAL (PRIMARY) HYPERTENSION: ICD-10-CM

## 2024-11-29 DIAGNOSIS — E66.9 OBESITY, UNSPECIFIED: ICD-10-CM

## 2024-11-29 DIAGNOSIS — N18.31 CHRONIC KIDNEY DISEASE, STAGE 3A: ICD-10-CM

## 2024-11-29 RX ORDER — BUDESONIDE AND FORMOTEROL FUMARATE DIHYDRATE 160; 4.5 UG/1; UG/1
160-4.5 AEROSOL RESPIRATORY (INHALATION) TWICE DAILY
Refills: 0 | Status: ACTIVE | COMMUNITY

## 2024-11-29 RX ORDER — INSULIN HUMAN 100 [IU]/ML
100 INJECTION, SOLUTION PARENTERAL
Refills: 0 | Status: ACTIVE | COMMUNITY

## 2024-11-29 RX ORDER — FLUCONAZOLE 150 MG/1
150 TABLET ORAL ONCE
Refills: 0 | Status: ACTIVE | COMMUNITY

## 2024-11-29 RX ORDER — ISOSORBIDE MONONITRATE 30 MG/1
30 TABLET, EXTENDED RELEASE ORAL DAILY
Refills: 0 | Status: ACTIVE | COMMUNITY

## 2024-11-29 RX ORDER — ALBUTEROL SULFATE 90 UG/1
108 (90 BASE) INHALANT RESPIRATORY (INHALATION) EVERY 4 HOURS
Refills: 0 | Status: ACTIVE | COMMUNITY

## 2024-11-29 RX ORDER — CIDER VINEGAR 300 MG
1000 TABLET ORAL DAILY
Refills: 0 | Status: ACTIVE | COMMUNITY

## 2024-11-29 RX ORDER — FUROSEMIDE 40 MG/1
40 TABLET ORAL DAILY
Refills: 0 | Status: ACTIVE | COMMUNITY

## 2025-03-13 ENCOUNTER — APPOINTMENT (OUTPATIENT)
Dept: ENDOCRINOLOGY | Facility: CLINIC | Age: 70
End: 2025-03-13
Payer: MEDICARE

## 2025-03-13 VITALS
DIASTOLIC BLOOD PRESSURE: 58 MMHG | HEART RATE: 60 BPM | SYSTOLIC BLOOD PRESSURE: 110 MMHG | OXYGEN SATURATION: 98 % | WEIGHT: 182 LBS | BODY MASS INDEX: 31.07 KG/M2 | HEIGHT: 64 IN

## 2025-03-13 DIAGNOSIS — E78.5 HYPERLIPIDEMIA, UNSPECIFIED: ICD-10-CM

## 2025-03-13 DIAGNOSIS — E66.9 OBESITY, UNSPECIFIED: ICD-10-CM

## 2025-03-13 DIAGNOSIS — R94.6 ABNORMAL RESULTS OF THYROID FUNCTION STUDIES: ICD-10-CM

## 2025-03-13 DIAGNOSIS — Z79.4 TYPE 2 DIABETES MELLITUS W/OUT COMPLICATIONS: ICD-10-CM

## 2025-03-13 DIAGNOSIS — E11.9 TYPE 2 DIABETES MELLITUS W/OUT COMPLICATIONS: ICD-10-CM

## 2025-03-13 DIAGNOSIS — E04.1 NONTOXIC SINGLE THYROID NODULE: ICD-10-CM

## 2025-03-13 LAB — GLUCOSE BLDC GLUCOMTR-MCNC: 193

## 2025-03-13 PROCEDURE — 82962 GLUCOSE BLOOD TEST: CPT

## 2025-03-13 PROCEDURE — 99215 OFFICE O/P EST HI 40 MIN: CPT

## 2025-03-13 PROCEDURE — 95251 CONT GLUC MNTR ANALYSIS I&R: CPT

## 2025-03-24 ENCOUNTER — APPOINTMENT (OUTPATIENT)
Dept: ENDOCRINOLOGY | Facility: CLINIC | Age: 70
End: 2025-03-24

## 2025-05-16 ENCOUNTER — RX RENEWAL (OUTPATIENT)
Age: 70
End: 2025-05-16

## 2025-08-11 ENCOUNTER — APPOINTMENT (OUTPATIENT)
Dept: ENDOCRINOLOGY | Facility: CLINIC | Age: 70
End: 2025-08-11
Payer: MEDICARE

## 2025-08-11 ENCOUNTER — NON-APPOINTMENT (OUTPATIENT)
Age: 70
End: 2025-08-11

## 2025-08-11 VITALS
BODY MASS INDEX: 32.32 KG/M2 | HEART RATE: 63 BPM | WEIGHT: 194 LBS | HEIGHT: 65 IN | OXYGEN SATURATION: 97 % | DIASTOLIC BLOOD PRESSURE: 64 MMHG | SYSTOLIC BLOOD PRESSURE: 138 MMHG

## 2025-08-11 DIAGNOSIS — E78.5 HYPERLIPIDEMIA, UNSPECIFIED: ICD-10-CM

## 2025-08-11 LAB — GLUCOSE BLDC GLUCOMTR-MCNC: 159

## 2025-08-11 PROCEDURE — 82962 GLUCOSE BLOOD TEST: CPT

## 2025-08-11 PROCEDURE — 95251 CONT GLUC MNTR ANALYSIS I&R: CPT

## 2025-08-11 PROCEDURE — 99214 OFFICE O/P EST MOD 30 MIN: CPT

## 2025-08-20 ENCOUNTER — APPOINTMENT (OUTPATIENT)
Dept: ENDOCRINOLOGY | Facility: CLINIC | Age: 70
End: 2025-08-20
Payer: MEDICARE

## 2025-08-20 DIAGNOSIS — Z79.4 TYPE 2 DIABETES MELLITUS W/OUT COMPLICATIONS: ICD-10-CM

## 2025-08-20 DIAGNOSIS — E11.9 TYPE 2 DIABETES MELLITUS W/OUT COMPLICATIONS: ICD-10-CM

## 2025-08-20 PROCEDURE — G0108 DIAB MANAGE TRN  PER INDIV: CPT

## 2025-08-22 ENCOUNTER — NON-APPOINTMENT (OUTPATIENT)
Age: 70
End: 2025-08-22

## 2025-08-22 RX ORDER — TIRZEPATIDE 2.5 MG/.5ML
2.5 INJECTION, SOLUTION SUBCUTANEOUS
Qty: 1 | Refills: 2 | Status: ACTIVE | COMMUNITY
Start: 2025-08-21 | End: 1900-01-01